# Patient Record
Sex: MALE | Race: WHITE | NOT HISPANIC OR LATINO | URBAN - METROPOLITAN AREA
[De-identification: names, ages, dates, MRNs, and addresses within clinical notes are randomized per-mention and may not be internally consistent; named-entity substitution may affect disease eponyms.]

---

## 2017-06-13 ENCOUNTER — IMPORTED ENCOUNTER (OUTPATIENT)
Dept: URBAN - METROPOLITAN AREA CLINIC 50 | Facility: CLINIC | Age: 77
End: 2017-06-13

## 2018-02-12 ENCOUNTER — IMPORTED ENCOUNTER (OUTPATIENT)
Dept: URBAN - METROPOLITAN AREA CLINIC 50 | Facility: CLINIC | Age: 78
End: 2018-02-12

## 2018-02-12 NOTE — PATIENT DISCUSSION
"""Dr. Franck Zamarripa to review test results with patient."" ""Based on increased c/d ratio and/or ocular hypertension

## 2018-09-24 ENCOUNTER — IMPORTED ENCOUNTER (OUTPATIENT)
Dept: URBAN - METROPOLITAN AREA CLINIC 50 | Facility: CLINIC | Age: 78
End: 2018-09-24

## 2018-10-02 ENCOUNTER — IMPORTED ENCOUNTER (OUTPATIENT)
Dept: URBAN - METROPOLITAN AREA CLINIC 50 | Facility: CLINIC | Age: 78
End: 2018-10-02

## 2018-10-05 ENCOUNTER — IMPORTED ENCOUNTER (OUTPATIENT)
Dept: URBAN - METROPOLITAN AREA CLINIC 50 | Facility: CLINIC | Age: 78
End: 2018-10-05

## 2018-10-10 ENCOUNTER — IMPORTED ENCOUNTER (OUTPATIENT)
Dept: URBAN - METROPOLITAN AREA CLINIC 50 | Facility: CLINIC | Age: 78
End: 2018-10-10

## 2018-10-10 NOTE — PATIENT DISCUSSION
"""S/P IOL OS: Tecnis ZCB00 18.5 (ORA) (Target: Near) +ORA/Femto/Arcs +Omidria. Continue post operative instructions and drops per schedule.  """

## 2018-10-15 ENCOUNTER — IMPORTED ENCOUNTER (OUTPATIENT)
Dept: URBAN - METROPOLITAN AREA CLINIC 50 | Facility: CLINIC | Age: 78
End: 2018-10-15

## 2018-10-24 ENCOUNTER — IMPORTED ENCOUNTER (OUTPATIENT)
Dept: URBAN - METROPOLITAN AREA CLINIC 50 | Facility: CLINIC | Age: 78
End: 2018-10-24

## 2018-10-24 NOTE — PATIENT DISCUSSION
"""S/P IOL OD: Tecnis ZCB00 15.5 (Target: Distance) +Femto/Arcs +Omidria. Continue post operative instructions and drops per schedule.  """

## 2018-11-01 ENCOUNTER — IMPORTED ENCOUNTER (OUTPATIENT)
Dept: URBAN - METROPOLITAN AREA CLINIC 50 | Facility: CLINIC | Age: 78
End: 2018-11-01

## 2018-12-10 ENCOUNTER — IMPORTED ENCOUNTER (OUTPATIENT)
Dept: URBAN - METROPOLITAN AREA CLINIC 50 | Facility: CLINIC | Age: 78
End: 2018-12-10

## 2019-02-26 ENCOUNTER — IMPORTED ENCOUNTER (OUTPATIENT)
Dept: URBAN - METROPOLITAN AREA CLINIC 50 | Facility: CLINIC | Age: 79
End: 2019-02-26

## 2019-09-17 ENCOUNTER — IMPORTED ENCOUNTER (OUTPATIENT)
Dept: URBAN - METROPOLITAN AREA CLINIC 50 | Facility: CLINIC | Age: 79
End: 2019-09-17

## 2019-10-15 ENCOUNTER — IMPORTED ENCOUNTER (OUTPATIENT)
Dept: URBAN - METROPOLITAN AREA CLINIC 50 | Facility: CLINIC | Age: 79
End: 2019-10-15

## 2019-10-22 ENCOUNTER — IMPORTED ENCOUNTER (OUTPATIENT)
Dept: URBAN - METROPOLITAN AREA CLINIC 50 | Facility: CLINIC | Age: 79
End: 2019-10-22

## 2019-10-29 ENCOUNTER — IMPORTED ENCOUNTER (OUTPATIENT)
Dept: URBAN - METROPOLITAN AREA CLINIC 50 | Facility: CLINIC | Age: 79
End: 2019-10-29

## 2020-06-12 ENCOUNTER — IMPORTED ENCOUNTER (OUTPATIENT)
Dept: URBAN - METROPOLITAN AREA CLINIC 50 | Facility: CLINIC | Age: 80
End: 2020-06-12

## 2020-12-16 ENCOUNTER — IMPORTED ENCOUNTER (OUTPATIENT)
Dept: URBAN - METROPOLITAN AREA CLINIC 50 | Facility: CLINIC | Age: 80
End: 2020-12-16

## 2021-04-17 ASSESSMENT — VISUAL ACUITY
OD_CC: 20/40-1
OS_OTHER: 20/30. 20/40.
OD_CC: J1+
OD_SC: 20/20-1
OS_PH: 20/30
OS_SC: 20/80
OD_BAT: 20/50
OS_CC: 20/100
OS_OTHER: 20/30.
OS_CC: J1+
OS_SC: 20/70
OD_CC: 20/20-
OD_SC: 20/25
OD_PH: 20/40-2
OS_SC: 20/200
OD_SC: 20/20
OS_CC: J1+
OD_CC: 20/40+2
OD_SC: 20/20
OS_BAT: 20/25
OS_CC: J1
OS_PH: 20/40-1
OD_CC: J1+
OS_SC: 20/80
OS_OTHER: 20/70. 20/70.
OD_BAT: 20/50
OD_CC: J1+@ 17 IN
OD_SC: 20/20-1
OD_CC: 20/20
OS_OTHER: 20/25. 20/40.
OS_CC: 20/20-1
OS_BAT: 20/30
OS_CC: J1+
OS_CC: 20/40+2
OS_PH: 20/30
OS_CC: 20/20
OS_SC: 20/200
OD_CC: J1
OS_CC: 20/200
OD_OTHER: 20/40. 20/50.
OD_CC: J1+
OD_BAT: 20/25
OS_CC: J1@ 16 IN
OS_CC: J1+
OS_SC: 20/100
OD_SC: 20/25
OD_CC: J1@ 16 IN
OD_OTHER: 20/50. 20/70.
OS_CC: 20/20
OS_CC: J1+@ 17 IN
OD_OTHER: 20/25. 20/30.
OS_SC: 20/200
OD_BAT: 20/50
OD_PH: @ 17 IN
OD_CC: J1+
OD_SC: 20/20
OS_BAT: 20/70
OS_OTHER: 20/70. 20/100.
OD_CC: J1+
OD_CC: J1
OD_SC: 20/200
OD_OTHER: 20/50. 20/70.
OD_BAT: 20/30
OS_CC: J1+
OS_SC: 20/200
OD_SC: 20/25
OD_CC: 20/20-1
OS_BAT: 20/70
OD_SC: 20/20
OD_BAT: 20/40
OD_OTHER: 20/30.
OS_PH: 20/40-2
OS_CC: J1
OD_SC: 20/30
OS_PH: @ 17 IN
OD_OTHER: 20/50. 20/70.
OS_BAT: 20/30
OS_SC: 20/80

## 2021-04-17 ASSESSMENT — TONOMETRY
OD_IOP_MMHG: 30
OS_IOP_MMHG: 11
OD_IOP_MMHG: 16
OD_IOP_MMHG: 13
OD_IOP_MMHG: 12
OS_IOP_MMHG: 9
OD_IOP_MMHG: 12
OD_IOP_MMHG: 13
OS_IOP_MMHG: 15
OS_IOP_MMHG: 13
OS_IOP_MMHG: 11
OS_IOP_MMHG: 12
OD_IOP_MMHG: 10
OD_IOP_MMHG: 11
OD_IOP_MMHG: 10
OS_IOP_MMHG: 14
OD_IOP_MMHG: 14
OD_IOP_MMHG: 16
OD_IOP_MMHG: 15
OS_IOP_MMHG: 12
OS_IOP_MMHG: 15
OD_IOP_MMHG: 14
OD_IOP_MMHG: 12
OD_IOP_MMHG: 13
OS_IOP_MMHG: 30
OS_IOP_MMHG: 12
OD_IOP_MMHG: 09
OD_IOP_MMHG: 14
OS_IOP_MMHG: 12
OS_IOP_MMHG: 13
OS_IOP_MMHG: 16
OS_IOP_MMHG: 13
OS_IOP_MMHG: 12

## 2021-04-17 ASSESSMENT — PACHYMETRY
OD_CT_UM: 579
OS_CT_UM: 606

## 2022-01-24 ENCOUNTER — PREPPED CHART (OUTPATIENT)
Dept: URBAN - METROPOLITAN AREA CLINIC 49 | Facility: CLINIC | Age: 82
End: 2022-01-24

## 2022-01-25 ENCOUNTER — DIAGNOSTICS ONLY (OUTPATIENT)
Dept: URBAN - METROPOLITAN AREA CLINIC 49 | Facility: CLINIC | Age: 82
End: 2022-01-25

## 2022-01-25 DIAGNOSIS — H40.013: ICD-10-CM

## 2022-01-25 PROCEDURE — 92083 EXTENDED VISUAL FIELD XM: CPT

## 2022-01-25 PROCEDURE — 92133 CPTRZD OPH DX IMG PST SGM ON: CPT
